# Patient Record
Sex: MALE | Race: WHITE | Employment: FULL TIME | URBAN - METROPOLITAN AREA
[De-identification: names, ages, dates, MRNs, and addresses within clinical notes are randomized per-mention and may not be internally consistent; named-entity substitution may affect disease eponyms.]

---

## 2018-09-03 ENCOUNTER — HOSPITAL ENCOUNTER (EMERGENCY)
Age: 28
Discharge: HOME | End: 2018-09-03
Attending: EMERGENCY MEDICINE
Payer: SELF-PAY

## 2018-09-03 ENCOUNTER — APPOINTMENT (OUTPATIENT)
Dept: GENERAL RADIOLOGY | Age: 28
End: 2018-09-03
Attending: PHYSICIAN ASSISTANT
Payer: SELF-PAY

## 2018-09-03 VITALS
HEART RATE: 96 BPM | DIASTOLIC BLOOD PRESSURE: 95 MMHG | HEIGHT: 74 IN | SYSTOLIC BLOOD PRESSURE: 149 MMHG | WEIGHT: 260 LBS | RESPIRATION RATE: 14 BRPM | TEMPERATURE: 98.3 F | OXYGEN SATURATION: 96 % | BODY MASS INDEX: 33.37 KG/M2

## 2018-09-03 DIAGNOSIS — R03.0 ELEVATED BLOOD PRESSURE READING: ICD-10-CM

## 2018-09-03 DIAGNOSIS — M79.671 FOOT PAIN, RIGHT: Primary | ICD-10-CM

## 2018-09-03 PROCEDURE — 99283 EMERGENCY DEPT VISIT LOW MDM: CPT

## 2018-09-03 PROCEDURE — 73630 X-RAY EXAM OF FOOT: CPT

## 2018-09-03 PROCEDURE — L1902 AFO ANKLE GAUNTLET PRE OTS: HCPCS

## 2018-09-03 RX ORDER — IBUPROFEN 600 MG/1
600 TABLET ORAL
Qty: 20 TAB | Refills: 0 | Status: SHIPPED | OUTPATIENT
Start: 2018-09-03

## 2018-09-03 NOTE — LETTER
[facility] EMERGENCY DEPT 
[facility] [id number] 
[phone] Work/School Note Date: 9/3/2018 To Whom It May concern: 
 
Elizabeth Cuadra was seen and treated today in the emergency room by the following provider(s): 
Attending Provider: Quinn Kerr MD 
Physician Assistant: Bridgette Cotto PA-C. Elizabeth Cuadra may return to work on 9/4/18. Sincerely, Bridgette Cotto PA-C

## 2018-09-03 NOTE — ED PROVIDER NOTES
EMERGENCY DEPARTMENT HISTORY AND PHYSICAL EXAM 
 
Date: 9/3/2018 Patient Name: Esperanza Anderson History of Presenting Illness Chief Complaint Patient presents with  Foot Pain History Provided By: Patient Chief Complaint: Foot Pain Duration: 2 weeks Timing:  Intermittent Location: Right foot Quality: N/A Severity: 4 out of 10 Modifying Factors: States that the pain is exacerbated when standing. Associated Symptoms: denies any other associated signs or symptoms Additional History (Context):  
Esperanza Anderson is a 33 y.o. male with PMHX of HTN who presents to the emergency department C/O right foot pain onset 2 weeks ago. Denies any recent injuries or trauma to the site. States that he noticed the pain upon awakening 2 weeks ago. States that the pain is exacerbated when standing. Pt denies fever, and any other sxs or complaints. Reports occasional EtOH use. No other concerns were expressed at this time. PCP: None Past History Past Medical History: 
Past Medical History:  
Diagnosis Date  Hypertension Past Surgical History: 
Past Surgical History:  
Procedure Laterality Date  HX ORTHOPAEDIC Family History: 
History reviewed. No pertinent family history. Social History: 
Social History Substance Use Topics  Smoking status: Never Smoker  Smokeless tobacco: Never Used  Alcohol use Yes Comment: occasional  
 
 
Allergies: 
No Known Allergies Review of Systems Review of Systems Constitutional: Negative for fever. Musculoskeletal: Positive for arthralgias (right foot pain ). All other systems reviewed and are negative. Physical Exam  
 
Vitals:  
 09/03/18 1938 BP: (!) 157/113 Pulse: 96  
Resp: 14 Temp: 98.3 °F (36.8 °C) SpO2: 96% Weight: 117.9 kg (260 lb) Height: 6' 2\" (1.88 m) Physical Exam  
Constitutional: He appears well-developed and well-nourished. No distress.   
HENT:  
 Head: Normocephalic and atraumatic. Right Ear: External ear normal.  
Left Ear: External ear normal.  
Nose: Nose normal.  
Eyes: Conjunctivae are normal.  
Neck: Normal range of motion. Cardiovascular: Normal rate and regular rhythm. Pulmonary/Chest: Effort normal. No respiratory distress. Musculoskeletal: He exhibits no edema or deformity. Right mid-foot, dorsum, hard palpable nodule that is firm and stationary with mild TTP. Not fluctuant, no surrounding erythema, edema or warmth. NVI. FROM of the ankle. Neurological: He is alert. Ambulates without assistance, abnormality, or apparent distress. Skin: Skin is warm and dry. He is not diaphoretic. Psychiatric: He has a normal mood and affect. Vitals reviewed. Diagnostic Study Results Labs - No results found for this or any previous visit (from the past 12 hour(s)). Radiologic Studies -  
NAP pending official radiology read. XR FOOT RT MIN 3 V    (Results Pending) CT Results  (Last 48 hours) None CXR Results  (Last 48 hours) None Medications given in the ED- Medications - No data to display Medical Decision Making I am the first provider for this patient. I reviewed the vital signs, available nursing notes, past medical history, past surgical history, family history and social history. Vital Signs-Reviewed the patient's vital signs. Records Reviewed: Nursing Notes and Triage notes Provider Notes (Medical Decision Making): Appears well and non-toxic, presenting with atraumatic right mid foot pain and hard palpable nodule. NO s/sx of infection at this time, x-ray without acute process. Will place in post op shoe, advise motrin, advise ortho follow up. Based on today's assessment, I feel the patient is stable for discharge to home with outpatient follow up. Return precautions and referrals provided. Procedures: 
Procedures Diagnosis and Disposition DISCHARGE NOTE: 
 8:46 PM 
Cj Smith's  results have been reviewed with him. He has been counseled regarding his diagnosis, treatment, and plan. He verbally conveys understanding and agreement of the signs, symptoms, diagnosis, treatment and prognosis and additionally agrees to follow up as discussed. He also agrees with the care-plan and conveys that all of his questions have been answered. I have also provided discharge instructions for him that include: educational information regarding their diagnosis and treatment, and list of reasons why they would want to return to the ED prior to their follow-up appointment, should his condition change. He has been provided with education for proper emergency department utilization. CLINICAL IMPRESSION: 
 
1. Foot pain, right 2. Elevated blood pressure reading PLAN: 
1. D/C Home 2. Current Discharge Medication List  
  
START taking these medications Details  
ibuprofen (MOTRIN) 600 mg tablet Take 1 Tab by mouth every six (6) hours as needed for Pain. Qty: 20 Tab, Refills: 0  
  
  
 
3. Follow-up Information Follow up With Details Comments Contact Info [address] [facility] in 2 days  [address] [address] [phone] Kerline Cain MD Schedule an appointment as soon as possible for a visit  [place] [address] [address] [place] [address] 
[phone] [facility] EMERGENCY DEPT Go to If symptoms worsen [address] [facility] 
[phone] Scribe Attestation Rosalinda Merino acting as a scribe for and in the presence of Kathryn Griggs September 03, 2018 at 7:41 PM 
    
Provider Attestation:     
I personally performed the services described in the documentation, reviewed the documentation, as recorded by the scribe in my presence, and it accurately and completely records my words and actions.  September 03, 2018 at 7:41 PM - DUSTIN Egale

## 2018-09-04 NOTE — DISCHARGE INSTRUCTIONS
Elevated Blood Pressure: Care Instructions  Your Care Instructions    Blood pressure is a measure of how hard the blood pushes against the walls of your arteries. It's normal for blood pressure to go up and down throughout the day. But if it stays up over time, you have high blood pressure. Two numbers tell you your blood pressure. The first number is the systolic pressure. It shows how hard the blood pushes when your heart is pumping. The second number is the diastolic pressure. It shows how hard the blood pushes between heartbeats, when your heart is relaxed and filling with blood. An ideal blood pressure in adults is less than 120/80 (say \"120 over 80\"). High blood pressure is 140/90 or higher. You have high blood pressure if your top number is 140 or higher or your bottom number is 90 or higher, or both. The main test for high blood pressure is simple, fast, and painless. To diagnose high blood pressure, your doctor will test your blood pressure at different times. After testing your blood pressure, your doctor may ask you to test it again when you are home. If you are diagnosed with high blood pressure, you can work with your doctor to make a long-term plan to manage it. Follow-up care is a key part of your treatment and safety. Be sure to make and go to all appointments, and call your doctor if you are having problems. It's also a good idea to know your test results and keep a list of the medicines you take. How can you care for yourself at home? · Do not smoke. Smoking increases your risk for heart attack and stroke. If you need help quitting, talk to your doctor about stop-smoking programs and medicines. These can increase your chances of quitting for good. · Stay at a healthy weight. · Try to limit how much sodium you eat to less than 2,300 milligrams (mg) a day. Your doctor may ask you to try to eat less than 1,500 mg a day. · Be physically active.  Get at least 30 minutes of exercise on most days of the week. Walking is a good choice. You also may want to do other activities, such as running, swimming, cycling, or playing tennis or team sports. · Avoid or limit alcohol. Talk to your doctor about whether you can drink any alcohol. · Eat plenty of fruits, vegetables, and low-fat dairy products. Eat less saturated and total fats. · Learn how to check your blood pressure at home. When should you call for help? Call your doctor now or seek immediate medical care if:  ? · Your blood pressure is much higher than normal (such as 180/110 or higher). ? · You think high blood pressure is causing symptoms such as:  ¨ Severe headache. ¨ Blurry vision. ? Watch closely for changes in your health, and be sure to contact your doctor if:  ? · You do not get better as expected. Where can you learn more? Go to http://jamilaOrbital Tractionayesha.info/. Enter C377 in the search box to learn more about \"Elevated Blood Pressure: Care Instructions. \"  Current as of: September 21, 2016  Content Version: 11.4  © 3079-9023 [employer]. Care instructions adapted under license by [employer] (which disclaims liability or warranty for this information). If you have questions about a medical condition or this instruction, always ask your healthcare professional. [address] any warranty or liability for your use of this information. Foot Pain: Care Instructions  Your Care Instructions  Foot injuries that cause pain and swelling are fairly common. Almost all sports or home repair projects can cause a misstep that ends up as foot pain. Normal wear and tear, especially as you get older, also can cause foot pain. Most minor foot injuries will heal on their own, and home treatment is usually all you need to do. If you have a severe injury, you may need tests and treatment. Follow-up care is a key part of your treatment and safety.  Be sure to make and go to all appointments, and call your doctor if you are having problems. It's also a good idea to know your test results and keep a list of the medicines you take. How can you care for yourself at home? · Take pain medicines exactly as directed. ¨ If the doctor gave you a prescription medicine for pain, take it as prescribed. ¨ If you are not taking a prescription pain medicine, ask your doctor if you can take an over-the-counter medicine. · Rest and protect your foot. Take a break from any activity that may cause pain. · Put ice or a cold pack on your foot for 10 to 20 minutes at a time. Put a thin cloth between the ice and your skin. · Prop up the sore foot on a pillow when you ice it or anytime you sit or lie down during the next 3 days. Try to keep it above the level of your heart. This will help reduce swelling. · Your doctor may recommend that you wrap your foot with an elastic bandage. Keep your foot wrapped for as long as your doctor advises. · If your doctor recommends crutches, use them as directed. · Wear roomy footwear. · As soon as pain and swelling end, begin gentle exercises of your foot. Your doctor can tell you which exercises will help. When should you call for help? Call 911 anytime you think you may need emergency care. For example, call if:    · Your foot turns pale, white, blue, or cold.    Call your doctor now or seek immediate medical care if:    · You cannot move or stand on your foot.     · Your foot looks twisted or out of its normal position.     · Your foot is not stable when you step down.     · You have signs of infection, such as:  ¨ Increased pain, swelling, warmth, or redness. ¨ Red streaks leading from the sore area. ¨ Pus draining from a place on your foot.   ¨ A fever.     · Your foot is numb or tingly.    Watch closely for changes in your health, and be sure to contact your doctor if:    · You do not get better as expected.     · You have bruises from an injury that last longer than 2 weeks. Where can you learn more? Go to http://jamila-ayesha.info/. Enter D367 in the search box to learn more about \"Foot Pain: Care Instructions. \"  Current as of: November 29, 2017  Content Version: 11.7  © 0067-4436 [employer]. Care instructions adapted under license by [employer] (which disclaims liability or warranty for this information). If you have questions about a medical condition or this instruction, always ask your healthcare professional. [address] any warranty or liability for your use of this information.